# Patient Record
Sex: MALE | Race: WHITE | NOT HISPANIC OR LATINO | Employment: STUDENT | URBAN - METROPOLITAN AREA
[De-identification: names, ages, dates, MRNs, and addresses within clinical notes are randomized per-mention and may not be internally consistent; named-entity substitution may affect disease eponyms.]

---

## 2017-06-08 ENCOUNTER — HOSPITAL ENCOUNTER (EMERGENCY)
Facility: HOSPITAL | Age: 22
Discharge: HOME | End: 2017-06-08
Attending: EMERGENCY MEDICINE | Admitting: EMERGENCY MEDICINE

## 2017-06-08 VITALS
HEART RATE: 81 BPM | RESPIRATION RATE: 18 BRPM | TEMPERATURE: 97.3 F | OXYGEN SATURATION: 98 % | DIASTOLIC BLOOD PRESSURE: 100 MMHG | HEIGHT: 72 IN | WEIGHT: 200 LBS | BODY MASS INDEX: 27.09 KG/M2 | SYSTOLIC BLOOD PRESSURE: 145 MMHG

## 2017-06-08 DIAGNOSIS — H57.11 ACUTE RIGHT EYE PAIN: Primary | ICD-10-CM

## 2017-06-08 PROCEDURE — 99283 EMERGENCY DEPT VISIT LOW MDM: CPT

## 2017-06-08 RX ORDER — GUANFACINE 2 MG/1
2 TABLET, EXTENDED RELEASE ORAL DAILY
COMMUNITY

## 2017-06-08 RX ORDER — HYDROCODONE BITARTRATE AND ACETAMINOPHEN 7.5; 325 MG/1; MG/1
1 TABLET ORAL ONCE
Status: COMPLETED | OUTPATIENT
Start: 2017-06-08 | End: 2017-06-08

## 2017-06-08 RX ORDER — HYDROCODONE BITARTRATE AND ACETAMINOPHEN 7.5; 325 MG/1; MG/1
1 TABLET ORAL EVERY 6 HOURS PRN
Qty: 12 TABLET | Refills: 0 | Status: SHIPPED | OUTPATIENT
Start: 2017-06-08

## 2017-06-08 RX ORDER — TETRACAINE HYDROCHLORIDE 5 MG/ML
2 SOLUTION OPHTHALMIC ONCE
Status: COMPLETED | OUTPATIENT
Start: 2017-06-08 | End: 2017-06-08

## 2017-06-08 RX ADMIN — FLUORESCEIN SODIUM 1 STRIP: 1 STRIP OPHTHALMIC at 21:30

## 2017-06-08 RX ADMIN — HYDROCODONE BITARTRATE AND ACETAMINOPHEN 1 TABLET: 7.5; 325 TABLET ORAL at 21:29

## 2017-06-08 RX ADMIN — TETRACAINE HYDROCHLORIDE 2 DROP: 5 SOLUTION OPHTHALMIC at 21:30

## 2017-06-09 NOTE — ED PROVIDER NOTES
The patient presents complaining of eye pain. The pt states he used  contact solution 6 hours ago and he has had redness and burning since.    Discussed the plan to discharge the pt with the plan to f/u with Dr. Myles tomorrow. I advised the pt to avoid using his contacts. The pt understands and agrees with the plan.    Limited physical exam:  Patient is nontoxic appearing and in NAD. The pt is alert, oriented, and neurovascularly intact. The pt's conjunctivae and sclera appear clear.    I supervised care provided by the midlevel provider.  We have discussed this patient's history, physical exam, and treatment plan.  I have reviewed the note and personally saw and examined the patient and agree with the plan of care.    NEMO Gallardo  17 2026       Damián Zhao MD  17 [id number]

## 2017-06-09 NOTE — ED TRIAGE NOTES
"Pt reports he used  contact solution in BL eyes six hours ago and is having redness and burning.      Pt seen by Optometrist today and was told \"the top layer of my eye is burned off\".  No irrigation performed.    Pt states \"I can't see hardly at all\".  "

## 2017-06-09 NOTE — ED PROVIDER NOTES
EMERGENCY DEPARTMENT ENCOUNTER    CHIEF COMPLAINT  Chief Complaint: eye pain  History given by: patient, family  History limited by: none  Room Number:   PMD: Pallavi Mccormack MD      HPI:  Pt is a 21 y.o. male who presents complaining of eye pain (right worse than left) onset approximately 6 hours ago. The pt says the burning and redness to his eye started after he used  contact solution. The pt saw the optometrist who examined his eye under fluoro and stated that there was damage c/w chemical burn. The pt was Rx Tobradex for but it has not helped to improve his pain.     Duration:  Approximately 6 hours  Onset: gradual  Timing: constant  Location: right eye  Radiation: N/A  Quality: pain, redness   Intensity/Severity: moderate  Progression: unchanged  Associated Symptoms: blurry vision  Aggravating Factors: N/A  Alleviating Factors: N/A  Previous Episodes: none  Treatment before arrival: Tobradex    PAST MEDICAL HISTORY  Active Ambulatory Problems     Diagnosis Date Noted   • No Active Ambulatory Problems     Resolved Ambulatory Problems     Diagnosis Date Noted   • No Resolved Ambulatory Problems     Past Medical History:   Diagnosis Date   • ADHD (attention deficit hyperactivity disorder)    • Anxiety        PAST SURGICAL HISTORY  Past Surgical History:   Procedure Laterality Date   • TONSILLECTOMY     • WISDOM TOOTH EXTRACTION         FAMILY HISTORY  History reviewed. No pertinent family history.    SOCIAL HISTORY  Social History     Social History   • Marital status: Single     Spouse name: N/A   • Number of children: N/A   • Years of education: N/A     Occupational History   • Not on file.     Social History Main Topics   • Smoking status: Never Smoker   • Smokeless tobacco: Not on file   • Alcohol use No   • Drug use: No   • Sexual activity: Not on file     Other Topics Concern   • Not on file     Social History Narrative   • No narrative on file       ALLERGIES  Penicillins and Sulfa  antibiotics    REVIEW OF SYSTEMS  Review of Systems   Constitutional: Negative.    HENT: Negative.    Eyes: Positive for pain, redness and visual disturbance.        Right sided eye pain and redness   Respiratory: Negative.    Cardiovascular: Negative.    Gastrointestinal: Negative.    Genitourinary: Negative.    Musculoskeletal: Negative.    All other systems reviewed and are negative.      PHYSICAL EXAM  ED Triage Vitals   Temp Heart Rate Resp BP SpO2   06/08/17 2048 06/08/17 2048 06/08/17 2048 06/08/17 2053 06/08/17 2048   97.3 °F (36.3 °C) 81 18 148/95 98 %      Temp src Heart Rate Source Patient Position BP Location FiO2 (%)   06/08/17 2048 06/08/17 2048 -- -- --   Tympanic Monitor          Physical Exam   Constitutional: He is oriented to person, place, and time and well-developed, well-nourished, and in no distress. He appears distressed (pt appears to be in discomfort due to eye pain).   HENT:   Head: Normocephalic and atraumatic.   Mouth/Throat: Oropharynx is clear and moist.   Eyes: EOM are normal. Pupils are equal, round, and reactive to light. Right eye exhibits no exudate.   Right eye - mildly injected, eyelids normal, no hyphema.  No fluorescein uptake.   Neck: Normal range of motion.   Pulmonary/Chest: Effort normal. No respiratory distress.   Neurological: He is alert and oriented to person, place, and time.   Skin: Skin is warm and dry. No rash noted. No pallor.   Psychiatric: Mood, memory, affect and judgment normal.   Nursing note and vitals reviewed.      PROCEDURES  Procedures      PROGRESS AND CONSULTS  ED Course     9:25 PM  Ordered Norco and Altacaine for pain and eye exam.     9:48 PM  Eye exam under fluoro was performed.     10:08 PM  Call returned by Dr. Myles (opthalmologist) who recommends pt leave contacts out, use previously Rx drops, Rx pain meds, and to f/u with office tomorrow.     10:14 PM  Pt rechecked and is resting comfortably. D/w pt consult with Dr. Myles and decision to  d/c. Pt was instructed to f/u with Dr. Myles's office tomorrow. Pt understands and agrees with plan of care, all questions and concerns addressed. Pt understands and agrees with plan of care, all questions and concerns addressed.     10:17 PM  Discussed pt's case with Dr. Zhao who agrees with plan of care.       MEDICAL DECISION MAKING  Results were reviewed/discussed with the patient and they were also made aware of online access. Pt also made aware that some labs, such as cultures, will not be resulted during ER visit and follow up with PMD is necessary.     MDM  Number of Diagnoses or Management Options  Acute right eye pain:      Amount and/or Complexity of Data Reviewed  Discuss the patient with other providers: yes (Dr. Myles (opthalmologist) )           DIAGNOSIS  Final diagnoses:   Acute right eye pain       DISPOSITION  DISCHARGE    Patient discharged in stable condition.    Reviewed implications of results, diagnosis, meds, responsibility to follow up, warning signs and symptoms of possible worsening, potential complications and reasons to return to ER.    Patient/Family voiced understanding of above instructions.    Discussed plan for discharge, as there is no emergent indication for admission.  Pt/family is agreeable and understands need for follow up and repeat testing.  Pt is aware that discharge does not mean that nothing is wrong but it indicates no emergency is present that requires admission and they must continue care with follow-up as given below or physician of their choice.     FOLLOW-UP  Trace Myles MD  [address] [place], [place] [id number] [phone]      Tomorrow         Medication List      New Prescriptions          HYDROcodone-acetaminophen 7.5-325 MG per tablet   Commonly known as:  NORCO   Take 1 tablet by mouth Every 6 (Six) Hours As Needed for Moderate Pain   (4-6).               Latest Documented Vital Signs:  As of 11:06 PM  BP- 145/100 HR- 81 Temp- 97.3 °F (36.3  °C) (Tympanic) O2 sat- 98%    --  Documentation assistance provided by chan Jimenez for YANIRA Shearer. Information recorded by the [employer] was done at my direction and has been verified and validated by me.       James Jimenez  06/08/17 [id number]       NEMO Guerrier  06/08/17 [id number]

## 2017-06-09 NOTE — DISCHARGE INSTRUCTIONS
Home, rest, medicine as prescribed, follow up with eye doctor tomorrow for recheck. Return to care with further concerns.